# Patient Record
Sex: FEMALE | Race: WHITE | NOT HISPANIC OR LATINO | Employment: PART TIME | URBAN - METROPOLITAN AREA
[De-identification: names, ages, dates, MRNs, and addresses within clinical notes are randomized per-mention and may not be internally consistent; named-entity substitution may affect disease eponyms.]

---
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
## 2019-04-23 ENCOUNTER — OFFICE VISIT (OUTPATIENT)
Dept: OBGYN CLINIC | Age: 19
End: 2019-04-23
Payer: COMMERCIAL

## 2019-04-23 VITALS
WEIGHT: 123.8 LBS | BODY MASS INDEX: 20.62 KG/M2 | HEART RATE: 89 BPM | SYSTOLIC BLOOD PRESSURE: 110 MMHG | TEMPERATURE: 97.7 F | HEIGHT: 65 IN | DIASTOLIC BLOOD PRESSURE: 64 MMHG

## 2019-04-23 DIAGNOSIS — Z20.2 POSSIBLE EXPOSURE TO STD: ICD-10-CM

## 2019-04-23 DIAGNOSIS — Z30.09 FAMILY PLANNING COUNSELING: Primary | ICD-10-CM

## 2019-04-23 DIAGNOSIS — Z32.02 URINE PREGNANCY TEST NEGATIVE: ICD-10-CM

## 2019-04-23 PROBLEM — A74.9 CHLAMYDIA INFECTION: Status: ACTIVE | Noted: 2018-04-16

## 2019-04-23 LAB
CONTROL: NORMAL
PREGNANCY TEST URINE, POC: NEGATIVE

## 2019-04-23 PROCEDURE — G8427 DOCREV CUR MEDS BY ELIG CLIN: HCPCS | Performed by: OBSTETRICS & GYNECOLOGY

## 2019-04-23 PROCEDURE — 99202 OFFICE O/P NEW SF 15 MIN: CPT | Performed by: OBSTETRICS & GYNECOLOGY

## 2019-04-23 PROCEDURE — 1036F TOBACCO NON-USER: CPT | Performed by: OBSTETRICS & GYNECOLOGY

## 2019-04-23 PROCEDURE — 81025 URINE PREGNANCY TEST: CPT | Performed by: OBSTETRICS & GYNECOLOGY

## 2019-04-23 PROCEDURE — G8420 CALC BMI NORM PARAMETERS: HCPCS | Performed by: OBSTETRICS & GYNECOLOGY

## 2019-04-23 RX ORDER — SUMATRIPTAN 50 MG/1
50 TABLET, FILM COATED ORAL
COMMUNITY
Start: 2019-04-11

## 2019-04-23 SDOH — HEALTH STABILITY: MENTAL HEALTH: HOW OFTEN DO YOU HAVE A DRINK CONTAINING ALCOHOL?: NEVER

## 2019-04-23 NOTE — PROGRESS NOTES
Annual Exam      CC:   Chief Complaint   Patient presents with    New Patient       HPI:  20 y.o.  presents to establish care and be tested for STIs. Patient seen and examined. Patient is doing well today. Patient had an  6  Months ago, reports uncomplicated pregnancy course and delivery. Is not currently using any birth control. Patient would like testing for STIs. Positive testing for Chlamydia 3/26/2018 with negative tests following 5/10/2018 and 10/9/2018. Reports menses are irregular. Occur every 4-6 weeks though patient is not sure of exact day, states she will sometimes have 2 a month, one at the beginning and one at the end. Patient had a nexplanon in place and noticed a change in cycles after Nexplanon was removed. Current cycles last for 3-4 days with light bleeding. Denies pelvic pain with menses. Menarche at age 12. Health Maintenance:  Birth control: None  Pregnancy plans: None currently  Safe relationship: Current partner - together 5 months    Screening:  Last pap smear: N/A  History of abnormal pap smears: N/A  STI screening:Last testing 10/9/2018 - Negative    Vaccines:  Gardasil vaccine: Has not had - recommended establishing with PCP and obtaining    Review of Systems:   Review of Systems   Constitutional: Negative for chills and fever. HENT: Negative for congestion, sinus pressure, sneezing and sore throat. Eyes: Negative for discharge and itching. Respiratory: Negative for cough and shortness of breath. Cardiovascular: Negative for chest pain and palpitations. Gastrointestinal: Negative for abdominal pain, constipation, diarrhea, nausea and vomiting. Genitourinary: Positive for menstrual problem (menses occur every 4-6 weeks). Negative for dysuria, frequency, pelvic pain and vaginal discharge. Musculoskeletal: Negative. Skin: Negative. Allergic/Immunologic: Negative. Neurological: Positive for headaches (Takes imitrex for migraines). Psychiatric/Behavioral: Negative. Primary Care Physician: No primary care provider on file. Obstetric History  OB History    Para Term  AB Living   1 1 1     1   SAB TAB Ectopic Molar Multiple Live Births             1      # Outcome Date GA Lbr Keenan/2nd Weight Sex Delivery Anes PTL Lv   1 Term 10/28/18 40w0d  9 lb 10 oz (4.366 kg) F Vag-Spont EPI  SEGUN       GynecologicHistory  Menstrual History:   LMP: Patient's last menstrual period was 2019 (approximate).  Age of Menarche: 12   Menstrual Period: irregular   Interval BetweenMenses: Every 4-6 weeks   Duration of Menses: 3-4 days   Menstrual Flow: Light   Bleeding between menses: Denies    Sexual History:   Contraception: see above   Currently is sexually active   5 Lifetime partners   Denies historyof STIs   Denies sexual problems    Pap History:   History of abnormal pap smears: see above   Last pap: see above      Medical History:  Past Medical History:   Diagnosis Date    Migraine        Medications:  Current Outpatient Medications   Medication Sig Dispense Refill    metroNIDAZOLE (FLAGYL) 500 MG tablet Take 1 tablet by mouth 2 times daily for 7 days 14 tablet 0    SUMAtriptan (IMITREX) 50 MG tablet Take 50 mg by mouth       No current facility-administered medications for this visit. Surgical History:  History reviewed. No pertinent surgical history. Allergies:  No Known Allergies    Family History:  Family History   Problem Relation Age of Onset    Cancer Paternal Grandmother 48        breast    Sudden Death Father        Reports personal/family history ofcervical, uterine, ovarian, vulvar, breast, or colon cancers.      - Paternal grandmother - breast cancer at age 48  Denies personal/family history of bleeding orclotting disorders  Denies personal/family history of genetic disorders    Social History:  Social History     Socioeconomic History    Marital status: Single     Spouse name: None    Number of children: None    Years of education: None    Highest education level: None   Occupational History    Occupation: [employer]   Social Needs    Financial resource strain: None    Food insecurity:     Worry: None     Inability: None    Transportation needs:     Medical: None     Non-medical: None   Tobacco Use    Smoking status: Never Smoker    Smokeless tobacco: Never Used   Substance and Sexual Activity    Alcohol use: Never     Frequency: Never    Drug use: Never    Sexual activity: Yes     Partners: Male     Comment: none   Lifestyle    Physical activity:     Days per week: None     Minutes per session: None    Stress: None   Relationships    Social connections:     Talks on phone: None     Gets together: None     Attends [employer] service: None     Active member of club or organization: None     Attends meetings of clubs or organizations: None     Relationship status: None    Intimate partner violence:     Fear of current or ex partner: None     Emotionally abused: None     Physically abused: None     Forced sexual activity: None   Other Topics Concern    None   Social History Narrative    None       Objective: Body mass index is 20.6 kg/m². /64 (Site: Right Upper Arm, Position: Sitting, Cuff Size: Medium Adult)   Pulse 89   Temp 97.7 °F (36.5 °C) (Oral)   Ht 5' 5\" (1.651 m)   Wt 123 lb 12.8 oz (56.2 kg)   LMP 03/26/2019 (Approximate)   Breastfeeding? No   BMI 20.60 kg/m²     Exam:   Physical Exam   Constitutional: She is oriented to person, place, and time. She appears well-developed and well-nourished. HENT:   Head: Normocephalic and atraumatic. Mouth/Throat: Oropharynx is clear and moist.   Eyes: Conjunctivae and EOM are normal.   Neck: Normal range of motion. Neck supple. No tracheal deviation present. No thyromegaly present. Cardiovascular: Normal rate, regular rhythm and normal heart sounds. Exam reveals no gallop and no friction rub.    No murmur heard.  Pulmonary/Chest: Effort normal. No respiratory distress. She has no wheezes. Abdominal: Soft. She exhibits no distension and no mass. There is no tenderness. There is no rebound and no guarding. Genitourinary: There is no rash, tenderness or lesion on the right labia. There is no rash, tenderness or lesion on the left labia. Uterus is not deviated, not enlarged, not fixed and not tender. Cervix exhibits no motion tenderness, no discharge and no friability. Right adnexum displays no mass, no tenderness and no fullness. Left adnexum displays no mass, no tenderness and no fullness. No erythema, tenderness or bleeding in the vagina. No vaginal discharge found. Musculoskeletal: She exhibits no edema. Neurological: She is alert and oriented to person, place, and time. Skin: Skin is warm and dry. Psychiatric: She has a normal mood and affect. Her behavior is normal. Thought content normal.   Vitals reviewed. Assessment/Plan:  20 y.o. Michael Alert presenting to establish care and for STI testing    1. Family planning counseling     - Patient is not currently on contraception, has had Nexplanon in the past and she is not interested in one again     - Family planning methods discussed with patient including NFP, barrier methods, hormonal methods     - Discussed safe interpregnancy interval     - Patient reports a history of migraines with a history of flashes in her vision - R/B/A discussed and leaning more towards progesterone only methods given history      - Patient is thinking that she would like to proceed with IUD, however she would like to decide and call the office back. Discussed prior authorization and ordering process. Patient will call the office when she decides.      2. Possible exposure to STD     - Patient offered vaginal cultures as well as serum studies for HIV, Hepatitis, HSV, syphilis and declines serum studies at this time     - VAGINAL PATHOGENS PROBE *A     - C.trachomatis N.gonorrhoeae DNA     - Discussed barrier methods for prevention of STI transmission    3.  Urine pregnancy test negative      - Unprotected intercourse approximately 2 weeks ago, which would be around ovulation      - Negative testing today - discussed taking home pregnancy test if misses menses - should start in next week     - POCT urine pregnancy      Delbert Guerrero DO

## 2019-04-23 NOTE — PROGRESS NOTES
Patient has never had a Pap test.   Abnormal pap history? no  Last HPV screen: never  Patient has never had a mammogram.  Last Dexa Scan: N/A    Contraceptive method: None  No prior colonoscopy

## 2019-04-24 LAB
C TRACH DNA GENITAL QL NAA+PROBE: NEGATIVE
CANDIDA SPECIES, DNA PROBE: ABNORMAL
GARDNERELLA VAGINALIS, DNA PROBE: ABNORMAL
N. GONORRHOEAE DNA: NEGATIVE
TRICHOMONAS VAGINALIS DNA: ABNORMAL

## 2019-04-24 RX ORDER — METRONIDAZOLE 500 MG/1
500 TABLET ORAL 2 TIMES DAILY
Qty: 14 TABLET | Refills: 0 | Status: SHIPPED | OUTPATIENT
Start: 2019-04-24 | End: 2019-05-01

## 2019-04-24 ASSESSMENT — ENCOUNTER SYMPTOMS
VOMITING: 0
SINUS PRESSURE: 0
SHORTNESS OF BREATH: 0
CONSTIPATION: 0
EYE DISCHARGE: 0
DIARRHEA: 0
SORE THROAT: 0
COUGH: 0
ABDOMINAL PAIN: 0
ALLERGIC/IMMUNOLOGIC NEGATIVE: 1
EYE ITCHING: 0
NAUSEA: 0